# Patient Record
Sex: FEMALE | Race: WHITE | NOT HISPANIC OR LATINO
[De-identification: names, ages, dates, MRNs, and addresses within clinical notes are randomized per-mention and may not be internally consistent; named-entity substitution may affect disease eponyms.]

---

## 2018-04-05 ENCOUNTER — TRANSCRIPTION ENCOUNTER (OUTPATIENT)
Age: 53
End: 2018-04-05

## 2018-07-03 ENCOUNTER — TRANSCRIPTION ENCOUNTER (OUTPATIENT)
Age: 53
End: 2018-07-03

## 2019-05-08 PROBLEM — Z00.00 ENCOUNTER FOR PREVENTIVE HEALTH EXAMINATION: Status: ACTIVE | Noted: 2019-05-08

## 2019-05-10 ENCOUNTER — APPOINTMENT (OUTPATIENT)
Dept: MAMMOGRAPHY | Facility: CLINIC | Age: 54
End: 2019-05-10

## 2020-06-09 ENCOUNTER — EMERGENCY (EMERGENCY)
Facility: HOSPITAL | Age: 55
LOS: 0 days | Discharge: HOME | End: 2020-06-09
Attending: EMERGENCY MEDICINE
Payer: COMMERCIAL

## 2020-06-09 VITALS — WEIGHT: 139.99 LBS | HEIGHT: 70 IN

## 2020-06-09 VITALS
DIASTOLIC BLOOD PRESSURE: 82 MMHG | SYSTOLIC BLOOD PRESSURE: 126 MMHG | RESPIRATION RATE: 20 BRPM | HEART RATE: 57 BPM | OXYGEN SATURATION: 100 %

## 2020-06-09 DIAGNOSIS — M79.641 PAIN IN RIGHT HAND: ICD-10-CM

## 2020-06-09 DIAGNOSIS — S40.211A ABRASION OF RIGHT SHOULDER, INITIAL ENCOUNTER: ICD-10-CM
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
## 2020-06-09 DIAGNOSIS — S62.314A DISPLACED FRACTURE OF BASE OF FOURTH METACARPAL BONE, RIGHT HAND, INITIAL ENCOUNTER FOR CLOSED FRACTURE: ICD-10-CM
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
## 2020-06-09 DIAGNOSIS — S80.212A ABRASION, LEFT KNEE, INITIAL ENCOUNTER: ICD-10-CM

## 2020-06-09 DIAGNOSIS — S09.90XA UNSPECIFIED INJURY OF HEAD, INITIAL ENCOUNTER: ICD-10-CM
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
## 2020-06-09 DIAGNOSIS — R11.0 NAUSEA: ICD-10-CM
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
## 2020-06-09 DIAGNOSIS — S70.311A ABRASION, RIGHT THIGH, INITIAL ENCOUNTER: ICD-10-CM

## 2020-06-09 DIAGNOSIS — S60.512A ABRASION OF LEFT HAND, INITIAL ENCOUNTER: ICD-10-CM

## 2020-06-09 LAB
ALBUMIN SERPL ELPH-MCNC: 4.1 G/DL — SIGNIFICANT CHANGE UP (ref 3.3–5)
ALP SERPL-CCNC: 58 U/L — SIGNIFICANT CHANGE UP (ref 40–120)
ALT FLD-CCNC: 47 U/L — SIGNIFICANT CHANGE UP (ref 12–78)
ANION GAP SERPL CALC-SCNC: 5 MMOL/L — SIGNIFICANT CHANGE UP (ref 5–17)
APTT BLD: 27.2 SEC — LOW (ref 27.5–36.3)
AST SERPL-CCNC: 67 U/L — HIGH (ref 15–37)
BASOPHILS # BLD AUTO: 0.04 K/UL — SIGNIFICANT CHANGE UP (ref 0–0.2)
BASOPHILS NFR BLD AUTO: 0.5 % — SIGNIFICANT CHANGE UP (ref 0–2)
BILIRUB SERPL-MCNC: 0.3 MG/DL — SIGNIFICANT CHANGE UP (ref 0.2–1.2)
BUN SERPL-MCNC: 14 MG/DL — SIGNIFICANT CHANGE UP (ref 7–23)
CALCIUM SERPL-MCNC: 9.1 MG/DL — SIGNIFICANT CHANGE UP (ref 8.5–10.1)
CHLORIDE SERPL-SCNC: 104 MMOL/L — SIGNIFICANT CHANGE UP (ref 96–108)
CK SERPL-CCNC: 126 U/L — SIGNIFICANT CHANGE UP (ref 26–192)
CO2 SERPL-SCNC: 27 MMOL/L — SIGNIFICANT CHANGE UP (ref 22–31)
CREAT SERPL-MCNC: 1.04 MG/DL — SIGNIFICANT CHANGE UP (ref 0.5–1.3)
EOSINOPHIL # BLD AUTO: 0.17 K/UL — SIGNIFICANT CHANGE UP (ref 0–0.5)
EOSINOPHIL NFR BLD AUTO: 2.1 % — SIGNIFICANT CHANGE UP (ref 0–6)
ETHANOL SERPL-MCNC: <10 MG/DL — SIGNIFICANT CHANGE UP (ref 0–10)
GLUCOSE SERPL-MCNC: 130 MG/DL — HIGH (ref 70–99)
HCT VFR BLD CALC: 37.3 % — SIGNIFICANT CHANGE UP (ref 34.5–45)
HGB BLD-MCNC: 12.4 G/DL — SIGNIFICANT CHANGE UP (ref 11.5–15.5)
IMM GRANULOCYTES NFR BLD AUTO: 0.1 % — SIGNIFICANT CHANGE UP (ref 0–1.5)
INR BLD: 1.02 RATIO — SIGNIFICANT CHANGE UP (ref 0.88–1.16)
LIDOCAIN IGE QN: 366 U/L — SIGNIFICANT CHANGE UP (ref 73–393)
LYMPHOCYTES # BLD AUTO: 3.13 K/UL — SIGNIFICANT CHANGE UP (ref 1–3.3)
LYMPHOCYTES # BLD AUTO: 38.3 % — SIGNIFICANT CHANGE UP (ref 13–44)
MCHC RBC-ENTMCNC: 29 PG — SIGNIFICANT CHANGE UP (ref 27–34)
MCHC RBC-ENTMCNC: 33.2 GM/DL — SIGNIFICANT CHANGE UP (ref 32–36)
MCV RBC AUTO: 87.1 FL — SIGNIFICANT CHANGE UP (ref 80–100)
MONOCYTES # BLD AUTO: 0.68 K/UL — SIGNIFICANT CHANGE UP (ref 0–0.9)
MONOCYTES NFR BLD AUTO: 8.3 % — SIGNIFICANT CHANGE UP (ref 2–14)
NEUTROPHILS # BLD AUTO: 4.15 K/UL — SIGNIFICANT CHANGE UP (ref 1.8–7.4)
NEUTROPHILS NFR BLD AUTO: 50.7 % — SIGNIFICANT CHANGE UP (ref 43–77)
PLATELET # BLD AUTO: 293 K/UL — SIGNIFICANT CHANGE UP (ref 150–400)
POTASSIUM SERPL-MCNC: 3.9 MMOL/L — SIGNIFICANT CHANGE UP (ref 3.5–5.3)
POTASSIUM SERPL-SCNC: 3.9 MMOL/L — SIGNIFICANT CHANGE UP (ref 3.5–5.3)
PROT SERPL-MCNC: 7.5 GM/DL — SIGNIFICANT CHANGE UP (ref 6–8.3)
PROTHROM AB SERPL-ACNC: 11.3 SEC — SIGNIFICANT CHANGE UP (ref 10–12.9)
RBC # BLD: 4.28 M/UL — SIGNIFICANT CHANGE UP (ref 3.8–5.2)
RBC # FLD: 12.5 % — SIGNIFICANT CHANGE UP (ref 10.3–14.5)
SODIUM SERPL-SCNC: 136 MMOL/L — SIGNIFICANT CHANGE UP (ref 135–145)
TROPONIN I SERPL-MCNC: <0.015 NG/ML — SIGNIFICANT CHANGE UP (ref 0.01–0.04)
WBC # BLD: 8.18 K/UL — SIGNIFICANT CHANGE UP (ref 3.8–10.5)
WBC # FLD AUTO: 8.18 K/UL — SIGNIFICANT CHANGE UP (ref 3.8–10.5)

## 2020-06-09 PROCEDURE — 86850 RBC ANTIBODY SCREEN: CPT

## 2020-06-09 PROCEDURE — 70450 CT HEAD/BRAIN W/O DYE: CPT | Mod: 26

## 2020-06-09 PROCEDURE — 99284 EMERGENCY DEPT VISIT MOD MDM: CPT | Mod: 25

## 2020-06-09 PROCEDURE — 70450 CT HEAD/BRAIN W/O DYE: CPT

## 2020-06-09 PROCEDURE — 72125 CT NECK SPINE W/O DYE: CPT | Mod: 26

## 2020-06-09 PROCEDURE — 71260 CT THORAX DX C+: CPT

## 2020-06-09 PROCEDURE — 85025 COMPLETE CBC W/AUTO DIFF WBC: CPT

## 2020-06-09 PROCEDURE — 84484 ASSAY OF TROPONIN QUANT: CPT

## 2020-06-09 PROCEDURE — 73130 X-RAY EXAM OF HAND: CPT | Mod: 26,RT

## 2020-06-09 PROCEDURE — 74177 CT ABD & PELVIS W/CONTRAST: CPT | Mod: 26

## 2020-06-09 PROCEDURE — 85730 THROMBOPLASTIN TIME PARTIAL: CPT

## 2020-06-09 PROCEDURE — 99285 EMERGENCY DEPT VISIT HI MDM: CPT

## 2020-06-09 PROCEDURE — 72125 CT NECK SPINE W/O DYE: CPT

## 2020-06-09 PROCEDURE — 36415 COLL VENOUS BLD VENIPUNCTURE: CPT

## 2020-06-09 PROCEDURE — 71260 CT THORAX DX C+: CPT | Mod: 26

## 2020-06-09 PROCEDURE — 73130 X-RAY EXAM OF HAND: CPT | Mod: RT

## 2020-06-09 PROCEDURE — 80307 DRUG TEST PRSMV CHEM ANLYZR: CPT

## 2020-06-09 PROCEDURE — 83690 ASSAY OF LIPASE: CPT

## 2020-06-09 PROCEDURE — 86901 BLOOD TYPING SEROLOGIC RH(D): CPT

## 2020-06-09 PROCEDURE — 96374 THER/PROPH/DIAG INJ IV PUSH: CPT | Mod: XU

## 2020-06-09 PROCEDURE — 82550 ASSAY OF CK (CPK): CPT

## 2020-06-09 PROCEDURE — 85610 PROTHROMBIN TIME: CPT

## 2020-06-09 PROCEDURE — 86900 BLOOD TYPING SEROLOGIC ABO: CPT

## 2020-06-09 PROCEDURE — 74177 CT ABD & PELVIS W/CONTRAST: CPT

## 2020-06-09 PROCEDURE — 80053 COMPREHEN METABOLIC PANEL: CPT

## 2020-06-09 RX ORDER — ONDANSETRON 8 MG/1
1 TABLET, FILM COATED ORAL
Qty: 12 | Refills: 0
Start: 2020-06-09

## 2020-06-09 RX ORDER — ONDANSETRON 8 MG/1
4 TABLET, FILM COATED ORAL ONCE
Refills: 0 | Status: COMPLETED | OUTPATIENT
Start: 2020-06-09 | End: 2020-06-09

## 2020-06-09 RX ORDER — SODIUM CHLORIDE 9 MG/ML
1000 INJECTION INTRAMUSCULAR; INTRAVENOUS; SUBCUTANEOUS ONCE
Refills: 0 | Status: COMPLETED | OUTPATIENT
Start: 2020-06-09 | End: 2020-06-09

## 2020-06-09 RX ADMIN — SODIUM CHLORIDE 1000 MILLILITER(S): 9 INJECTION INTRAMUSCULAR; INTRAVENOUS; SUBCUTANEOUS at 19:50

## 2020-06-09 RX ADMIN — ONDANSETRON 4 MILLIGRAM(S): 8 TABLET, FILM COATED ORAL at 19:48

## 2020-06-09 NOTE — ED PROVIDER NOTE - SKIN, MLM
Abrasions on right shoulder, along top of right thigh extending the length of the thigh, left knee, and right hand over 5th MCP with tenderness along 5th metacarpal, additional scattered abrasions on left hand.

## 2020-06-09 NOTE — ED ADULT NURSE NOTE - NSIMPLEMENTINTERV_GEN_ALL_ED
Implemented All Fall Risk Interventions:  [place] to call system. Call bell, personal items and telephone within reach. Instruct patient to call for assistance. Room bathroom lighting operational. Non-slip footwear when patient is off stretcher. Physically safe environment: no spills, clutter or unnecessary equipment. Stretcher in lowest position, wheels locked, appropriate side rails in place. Provide visual cue, wrist band, yellow gown, etc. Monitor gait and stability. Monitor for mental status changes and reorient to person, place, and time. Review medications for side effects contributing to fall risk. Reinforce activity limits and safety measures with patient and family.

## 2020-06-09 NOTE — ED PROVIDER NOTE - NSFOLLOWUPINSTRUCTIONS_ED_ALL_ED_FT
EnglishSpanish    Concussion, Adult     A concussion is a brain injury from a hard, direct hit (trauma) to your head or body. This direct hit causes the brain to quickly shake back and forth inside the skull. A concussion may also be called a mild traumatic brain injury (TBI). Healing from this injury can take time.  What are the causes?  This condition is caused by:  A direct hit to your head, such as:  Running into a player during a game.Being hit in a fight. Hitting your head on a hard surface. A quick and sudden movement (jolt) of the head or neck, such as in a car crash.What are the signs or symptoms?  The signs of a concussion can be hard to notice. They may be missed by you, family members, and doctors. You may look fine on the outside but may not act or feel normal.  Physical symptoms     Headaches.Being tired (fatigued).Being dizzy.Problems with body balance.Problems seeing or hearing.Being sensitive to light or noise.Feeling sick to your stomach (nausea) or throwing up (vomiting).Not sleeping or eating as you used to.Loss of feeling (numbness) or tingling in the body.Seizure.Mental and emotional symptoms     Problems remembering things.Trouble focusing your mind (concentrating), organizing, or making decisions.Being slow to think, act, react, speak, or read.Feeling grouchy (irritable).Having mood changes.Feeling worried or nervous (anxious).Feeling sad (depressed).How is this treated?  This condition may be treated by:  Stopping sports or activity if you are injured. If you hit your head or have signs of concussion:  Do not return to sports or activities the same day.Get checked by a doctor before you return to your activities.Resting your body and your mind.Being watched carefully, often at home.Medicines to help with symptoms such as:  Feeling sick to your stomach.Headaches.Problems with sleep.  Avoid taking strong pain medicines (opioids) for a concussion.Avoiding alcohol and drugs.Being asked to go to a concussion clinic or a place to help you recover (rehabilitation center).Recovery from a concussion can take time. Return to activities only:  When you are fully healed.When your doctor says it is safe.Follow these instructions at home:  Activity     Limit activities that need a lot of thought or focus, such as:  Homework or work for your job.Watching TV.Using the computer or phone.Playing memory games and puzzles.Rest. Rest helps your brain heal. Make sure you:  Get plenty of sleep. Most adults should get 7–9 hours of sleep each night.Rest during the day. Take naps or breaks when you feel tired.Avoid activity like exercise until your doctor says its safe. Stop any activity that makes symptoms worse.Do not do activities that could cause a second concussion, such as riding a bike or playing sports.Ask your doctor when you can return to your normal activities, such as school, work, sports, and driving. Your ability to react may be slower. Do not do these activities if you are dizzy.General instructions        Take over-the-counter and prescription medicines only as told by your doctor.Do not drink alcohol until your doctor says you can.Watch your symptoms and tell other people to do the same. Other problems can occur after a concussion. Older adults have a higher risk of serious problems.Tell your , teachers, school nurse, school counselor, , or  about your injury and symptoms. Tell them about what you can or cannot do.Keep all follow-up visits as told by your doctor. This is important.How is this prevented?  It is very important that you do not get another brain injury. In rare cases, another injury can cause brain damage that will not go away, brain swelling, or death. The risk of this is greatest in the first 7–10 days after a head injury. To avoid injuries:  Stop activities that could lead to a second concussion, such as contact sports, until your doctor says it is okay.When you return to sports or activities:  Do not crash into other players. This is how most concussions happen.Follow the rules. Respect other players.Get regular exercise. Do strength and balance training.Wear a helmet that fits you well during sports, biking, or other activities.  Helmets can help protect you from serious skull and brain injuries, but they do not protect you from a concussion. Even when wearing a helmet, you should avoid being hit in the head.Contact a doctor if:  Your symptoms get worse or they do not get better.You have new symptoms.You have another injury.Get help right away if:  You have bad headaches or your headaches get worse.You feel weak or numb in any part of your body.You are mixed up (confused).Your balance gets worse.You keep throwing up.You feel more sleepy than normal.Your speech is not clear (is slurred).You cannot recognize people or places.You have a seizure.Others have trouble waking you up.You have behavior changes.You have changes in how you see (vision).You pass out (lose consciousness).Summary  A concussion is a brain injury from a hard, direct hit (trauma) to your head or body.This condition is treated with rest and careful watching of symptoms.If you keep having symptoms, call your doctor.This information is not intended to replace advice given to you by your health care provider. Make sure you discuss any questions you have with your health care provider.    Document Released: 12/06/2010 Document Revised: 08/08/2019 Document Reviewed: 08/08/2019  Elsevier Patient Education © 2020 [employer]

## 2020-06-09 NOTE — ED PROCEDURE NOTE - CPROC ED POST PROC CARE GUIDE1
Instructed patient/caregiver to follow-up with primary care physician./Keep the cast/splint/dressing clean and dry.

## 2020-06-09 NOTE — ED PROVIDER NOTE - CARE PROVIDER_API CALL
Giles Gordon  ORTHOPAEDIC SURGERY  [address] [place] [address]  Phone: [phone]  Fax: [phone]  Follow Up Time:
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
## 2020-06-09 NOTE — ED PROVIDER NOTE - PATIENT PORTAL LINK FT
You can access the FollowMyHealth Patient Portal offered by [facility] by registering at the following website: [email or web address]. By joining [employer]’s FollowMyHealth portal, you will also be able to view your health information using other applications (apps) compatible with our system.
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
## 2020-06-09 NOTE — ED PROVIDER NOTE - NS_EDPROVIDERDISPOUSERTYPE_ED_A_ED
Sepsis bolus infusing.  1st antibiotic started.    Scribe Attestation (For Scribes USE Only)... Scribe Attestation (For Scribes USE Only).../Attending Attestation (For Attendings USE Only)...
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
## 2020-06-10 NOTE — ED POST DISCHARGE NOTE - RESULT SUMMARY
[facility] fx on xray, I spoke to Dr Gordon who reviewed xrays, advises pt may keep velcro splint on and see him in office tomorrow. I spoke to pt and family on phone, instructed her to remove ring which she did on the phone and to keep splint on, elevate extremity and NWB. F/u Evan in office tomorrow.  Pt agrees with plan of  care. signed Shima Denney PA-C

## 2020-08-10 ENCOUNTER — APPOINTMENT (OUTPATIENT)
Dept: OBGYN | Facility: CLINIC | Age: 55
End: 2020-08-10

## 2020-08-19 ENCOUNTER — APPOINTMENT (OUTPATIENT)
Dept: OBGYN | Facility: CLINIC | Age: 55
End: 2020-08-19

## 2022-09-02 ENCOUNTER — NON-APPOINTMENT (OUTPATIENT)
Age: 57
End: 2022-09-02

## 2022-11-29 PROBLEM — Z78.9 OTHER SPECIFIED HEALTH STATUS: Chronic | Status: ACTIVE | Noted: 2020-06-09

## 2022-12-15 ENCOUNTER — TRANSCRIPTION ENCOUNTER (OUTPATIENT)
Age: 57
End: 2022-12-15

## 2022-12-15 ENCOUNTER — APPOINTMENT (OUTPATIENT)
Dept: OBGYN | Facility: CLINIC | Age: 57
End: 2022-12-15

## 2022-12-15 VITALS
DIASTOLIC BLOOD PRESSURE: 70 MMHG | TEMPERATURE: 97 F | SYSTOLIC BLOOD PRESSURE: 120 MMHG | HEIGHT: 69 IN | WEIGHT: 146 LBS | BODY MASS INDEX: 21.62 KG/M2

## 2022-12-15 DIAGNOSIS — E06.3 AUTOIMMUNE THYROIDITIS: ICD-10-CM

## 2022-12-15 DIAGNOSIS — Z82.49 FAMILY HISTORY OF ISCHEMIC HEART DISEASE AND OTHER DISEASES OF THE CIRCULATORY SYSTEM: ICD-10-CM

## 2022-12-15 DIAGNOSIS — Z78.9 OTHER SPECIFIED HEALTH STATUS: ICD-10-CM

## 2022-12-15 DIAGNOSIS — Z82.62 FAMILY HISTORY OF OSTEOPOROSIS: ICD-10-CM

## 2022-12-15 DIAGNOSIS — Z01.419 ENCOUNTER FOR GYNECOLOGICAL EXAMINATION (GENERAL) (ROUTINE) W/OUT ABNORMAL FINDINGS: ICD-10-CM

## 2022-12-15 DIAGNOSIS — Z83.3 FAMILY HISTORY OF DIABETES MELLITUS: ICD-10-CM

## 2022-12-15 DIAGNOSIS — Z83.49 FAMILY HISTORY OF OTHER ENDOCRINE, NUTRITIONAL AND METABOLIC DISEASES: ICD-10-CM

## 2022-12-15 PROCEDURE — 99386 PREV VISIT NEW AGE 40-64: CPT

## 2022-12-15 NOTE — DISCUSSION/SUMMARY
[FreeTextEntry1] : 1) pap performed\par 2) will obtain mammo results from [facility]\par 3) pt up to date with colonoscopy\par \par Return to office in one year, sooner prn

## 2022-12-15 NOTE — HISTORY OF PRESENT ILLNESS
[Patient reported colonoscopy was normal] : Patient reported colonoscopy was normal [Currently In Menopause] : currently in menopause [Currently Active] : currently active [Men] : men [No] : No [TextBox_4] : Maryam is a 56 y/o  who presents today for an annual exam.\par \par She is up to date with breast imaging and ciolonoscopy\par \par \par \par Her daughter is getting  in [facility].  She used to work in the [employer] - now works with her  in design and construction. [employer] [Mammogramdate] : 12/2022 [TextBox_19] : @ [facility] via Rx from PCP: they are monitoring an area in the right breast [ColonoscopyDate] : 2021

## 2022-12-16 LAB — HPV HIGH+LOW RISK DNA PNL CVX: NOT DETECTED

## 2022-12-20 LAB — CYTOLOGY CVX/VAG DOC THIN PREP: ABNORMAL

## 2022-12-22 ENCOUNTER — APPOINTMENT (OUTPATIENT)
Dept: DERMATOLOGY | Facility: CLINIC | Age: 57
End: 2022-12-22

## 2022-12-22 PROCEDURE — 99203 OFFICE O/P NEW LOW 30 MIN: CPT

## 2022-12-30 ENCOUNTER — NON-APPOINTMENT (OUTPATIENT)
Age: 57
End: 2022-12-30

## 2023-01-19 ENCOUNTER — APPOINTMENT (OUTPATIENT)
Dept: DERMATOLOGY | Facility: CLINIC | Age: 58
End: 2023-01-19
Payer: COMMERCIAL

## 2023-01-19 DIAGNOSIS — D48.5 NEOPLASM OF UNCERTAIN BEHAVIOR OF SKIN: ICD-10-CM

## 2023-01-19 DIAGNOSIS — L82.1 OTHER SEBORRHEIC KERATOSIS: ICD-10-CM

## 2023-01-19 PROCEDURE — 99213 OFFICE O/P EST LOW 20 MIN: CPT

## 2023-01-19 NOTE — ASSESSMENT
[FreeTextEntry1] : 1. Lentigines\par Discussed r/b/a of IPL. \par Patient understands that multiple tx may be required. OOP cost at CYP/tx. \par \par IPL test spot\par Risks and benefits of Intense Pulsed Light Therapy were discussed including swelling, discoloration, lack of response, and need for multiple treatments;\par IPL:  Handpiece, 540nm\par 13 Joules\par 10 nmsec;\par left lower cheek\par \par Site- Left lower cheek- test spot\par Patient to follow-up for full face IPL treatment at Dr. Gonzalez's [place] office.\par CYP\par

## 2023-01-19 NOTE — HISTORY OF PRESENT ILLNESS
[FreeTextEntry1] : RPA solar lentigines [de-identified] : Patient is a 57 yoF presenting today with her sister for cosmetic consultation for solar lentigines on the face.\par \par No P or F hx of skin cancer. \par Last skin exam December 2022 with Dr. Slava Gonzalez.

## 2023-01-29 ENCOUNTER — NON-APPOINTMENT (OUTPATIENT)
Age: 58
End: 2023-01-29

## 2023-01-30 ENCOUNTER — APPOINTMENT (OUTPATIENT)
Dept: DERMATOLOGY | Facility: CLINIC | Age: 58
End: 2023-01-30
Payer: SELF-PAY

## 2023-01-30 DIAGNOSIS — L81.4 OTHER MELANIN HYPERPIGMENTATION: ICD-10-CM

## 2023-01-30 PROCEDURE — D0083: CPT

## 2023-01-30 NOTE — ASSESSMENT
[FreeTextEntry1] : Risks and benefits of Intense Pulsed Light Therapy were discussed including swelling, discoloration, lack of response, and need for multiple treatments;\par \par IPL:  Handpiece, 540nm\par 10 Joules\par 10 nmsec;\par full face,;\par \par CYP

## 2023-01-31 ENCOUNTER — APPOINTMENT (OUTPATIENT)
Dept: DERMATOLOGY | Facility: CLINIC | Age: 58
End: 2023-01-31

## 2023-03-06 ENCOUNTER — APPOINTMENT (OUTPATIENT)
Dept: DERMATOLOGY | Facility: CLINIC | Age: 58
End: 2023-03-06

## 2023-11-13 ENCOUNTER — TRANSCRIPTION ENCOUNTER (OUTPATIENT)
Age: 58
End: 2023-11-13

## 2024-01-09 ENCOUNTER — APPOINTMENT (OUTPATIENT)
Dept: OBGYN | Facility: CLINIC | Age: 59
End: 2024-01-09
Payer: COMMERCIAL

## 2024-01-09 VITALS
HEIGHT: 69 IN | DIASTOLIC BLOOD PRESSURE: 76 MMHG | SYSTOLIC BLOOD PRESSURE: 118 MMHG | WEIGHT: 146 LBS | BODY MASS INDEX: 21.62 KG/M2

## 2024-01-09 DIAGNOSIS — N94.10 UNSPECIFIED DYSPAREUNIA: ICD-10-CM

## 2024-01-09 DIAGNOSIS — Z01.411 ENCOUNTER FOR GYNECOLOGICAL EXAMINATION (GENERAL) (ROUTINE) WITH ABNORMAL FINDINGS: ICD-10-CM

## 2024-01-09 DIAGNOSIS — Z12.39 ENCOUNTER FOR OTHER SCREENING FOR MALIGNANT NEOPLASM OF BREAST: ICD-10-CM

## 2024-01-09 DIAGNOSIS — Z12.4 ENCOUNTER FOR SCREENING FOR MALIGNANT NEOPLASM OF CERVIX: ICD-10-CM

## 2024-01-09 DIAGNOSIS — R92.30 DENSE BREASTS, UNSPECIFIED: ICD-10-CM

## 2024-01-09 PROCEDURE — 99396 PREV VISIT EST AGE 40-64: CPT

## 2024-01-09 RX ORDER — ESTRADIOL 10 UG/1
10 TABLET VAGINAL
Qty: 3 | Refills: 3 | Status: ACTIVE | COMMUNITY
Start: 2024-01-09 | End: 1900-01-01

## 2024-01-10 LAB — HPV HIGH+LOW RISK DNA PNL CVX: NOT DETECTED

## 2024-01-11 LAB — CYTOLOGY CVX/VAG DOC THIN PREP: NORMAL
